# Patient Record
(demographics unavailable — no encounter records)

---

## 2024-11-05 NOTE — REVIEW OF SYSTEMS
[Constipation: Grade 0] : Constipation: Grade 0 [Diarrhea: Grade 0] : Diarrhea: Grade 0 [Urinary Tract Pain: Grade 0] : Urinary Tract Pain: Grade 0 [Urinary Urgency: Grade 0] : Urinary Urgency: Grade 0 [Urinary Frequency: Grade 0] : Urinary Frequency: Grade 0 [Erectile Dysfunction: Grade 2 - Decrease in erectile function (frequency/rigidity of erections), erectile intervention indicated, (e.g., medication or mechanical devices such as penile pump)] : Erectile Dysfunction: Grade 2 - Decrease in erectile function (frequency/rigidity of erections), erectile intervention indicated, (e.g., medication or mechanical devices such as penile pump) [Fatigue: Grade 0] : Fatigue: Grade 0 [Urinary Retention: Grade 0] : Urinary Retention: Grade 0

## 2024-11-05 NOTE — PHYSICAL EXAM
[Normal] : well developed, well nourished, in no acute distress [] : no respiratory distress [Respiration, Rhythm And Depth] : normal respiratory rhythm and effort [Skin Color & Pigmentation] : normal skin color and pigmentation [Oriented To Time, Place, And Person] : oriented to person, place, and time

## 2024-11-09 NOTE — HISTORY OF PRESENT ILLNESS
[FreeTextEntry1] : Mr. Zamora is 71M w/ cT2b G7(3+4) iPSA 4.76 (6/7/22) favorable intermediate risk prostate cancer, previously on active surveillance. He had 91 cc gland on MRI 7/5/22. Significant LUTS at baseline. He is s/p definitive radiation therapy to the prostate and seminal vesicles on 11/18/2022.  Radiation  Treatment Summary:  Prostate/SV  7,000 cGy in 28 fx from 10/12/2022 to 11/18/2022  Clinical Course: Tolerated the treatment relatively poorly; he had significant obstructive issues at baseline and over treatment had worsening urgency and frequency, and eventually obstructed requiring catheter placement. Hematuria was noted following catheter placement.He then failed voiding trial requiring catheter reinsertion. He then became intermittently obstructed and subsequently saw Dr. Crenshaw and was taught to self catheterize.  12/28/22 PTE: Continues to self catheterize every 4 hours. Notes intermittent urge but unable to void.  Denies  dysuria, hematuria or bowel symptoms. Taking tamsulosin BID and finasteride daily. Has appointment to see Dr. Crenshaw on Friday 12/30/22. Of note,  has flu and taking tamiflu.   3/9/23: Follow up Notes improvement in  symptoms. Urinating naturally with occasional straining. Intermittent self catheterization 2 x day. Denies leakage, dysuria, hematuria or bowel symptoms. Taking tamsulosin 2 tabs daily and finasteride daily. Erections suitable for masturbation. Of note, PVR approx. 350-360 cc today in clinic.  IPSS/QOL/EPIC Score. 20/2/12.   6/15/23: Follow up Notes  function back to baseline. Completed pelvic floor rehab and now able to void naturally. Nocturia 1x, good stream without straining.  Denies leakage, dysuria, hematuria or  bowel symptoms. Taking tamsulosin 2 tab at night and finasteride daily. Erections suitable for masturbation only.  IPSS/QOL/EPIC Score 2/0/4.   PSA trend:  iPSA 4.76 on 6/7/22 -> RT completed 11/18/2022   0.48 3/6/23: (note, on finasteride) 0.27 6/9/23, Test 545 (still on finasteride)  0.13 10/19/24  11/5/2024: Follow up Feels generally well. GI/ symptoms at baseline. Erections only suitable for masturbation. Never tried medical intervention. Continues on tamsulosin every 3-4 days or longer when he remembers.  IPSS/QOL/EPIC score: 0/0/3

## 2024-11-09 NOTE — DISEASE MANAGEMENT
[2] : T2 [b] : b [0] : M0 [0-10] : 0 -10 ng/mL [Biopsy with Fusion] : Patient had a biopsy with fusion on [6] : Fusion Biopsy Lanny Score: 6 [Biopsy results sent to PCP/Referring Physician] : Biopsy results sent to PCP/Referring Physician [] : Patient had a Prostate MRI [3] : 3 [IIB] : IIB [Active Surveillance] : Active Surveillance [Clinical] : TNM Stage: c [BiopsyDate] : 01/17 [MeasuredProstateVolume] : 62ml [TotalCores] : 12 [MaxCoreInvolvement] : 30% [TotalPositiveCores] : 4 [LastPSADate] : 4/9/18 [LastPSAResults] : 2.39 [LastMRIDate] : 9/27/17 [LastMRIResults] : Lesions in the left mid gland peripheral zone posteriorly PIRADS 3. Interval stability as compared with the prior study. [FirstSurveillanceBiopsyResults] : Prostate tissue with a small focus of atypical glands [TTNM] : 2b [NTNM] : 0 [MTNM] : 0

## 2025-04-23 NOTE — ASSESSMENT
[FreeTextEntry1] : 04/23/2025: Hip and pelvis x-rays, 2 views, reveals mild OA in RT>LT hip.  L spine x-rays, 2 views, reveals no fx/abnormalities.  Underlying pathology reviewed and treatment options discussed. He will continue with PT. Patient can't take NSAIDs d/t being on blood thinners.  Discussed the use of Tylenol.  Discussed Carrington exercises.  Activity modification as tolerated. Questions addressed. Follow up in 4-6 weeks/PRN.

## 2025-04-23 NOTE — IMAGING
[de-identified] : HIP EXAM tenderness over RT buttock decrease ROM, devin with external rotation mild greater trochanteric tenderness

## 2025-04-23 NOTE — HISTORY OF PRESENT ILLNESS
[6] : 6 [5] : 5 [Dull/Aching] : dull/aching [Sharp] : sharp [Occasional] : occasional [Household chores] : household chores [Rest] : rest [Exercising] : exercising [de-identified] : 4/24/25: 72 Y M presents for right hip pain. Pain started in February, but no incident reported. Prior to this, he reports no hx of hip pain. Denies any n/t or radicular pain.  [] : Post Surgical Visit: no [FreeTextEntry1] : right hip [FreeTextEntry7] : down the hamstring

## 2025-05-15 NOTE — HISTORY OF PRESENT ILLNESS
[FreeTextEntry1] : Mr. Zamora is 71M w/ cT2b G7(3+4) iPSA 4.76 (6/7/22) favorable intermediate risk prostate cancer, previously on active surveillance. He had 91 cc gland on MRI 7/5/22. Significant LUTS at baseline. He is s/p definitive radiation therapy to the prostate and seminal vesicles on 11/18/2022.  Radiation Treatment Summary: Prostate/SV 7,000 cGy in 28 fx from 10/12/2022 to 11/18/2022  PSA trend: iPSA 4.76 on 6/7/22 -> RT completed 11/18/2022 0.48 3/6/23: (note, on finasteride) 0.27 6/9/23, Test 545 (still on finasteride) 0.13 10/19/24 0.16 4/21/2025  5/15/2025 present for follow up, patient reports doing very well, no /GI problem. Mild problem with erection but declines medication help. IPSS/QOL/EPIC 0/0/3

## 2025-06-18 NOTE — HISTORY OF PRESENT ILLNESS
[de-identified] : 06/18/2025: Patient returns for RT hip FUV. He has continued RT hip discomfort. He reports the pain is radiating down to his hamstrings. He reports that he is borderline diabetic.   4/24/25: 72 Y M presents for right hip pain. Pain started in February, but no incident reported. Prior to this, he reports no hx of hip pain. Denies any n/t or radicular pain.   [6] : 6 [5] : 5 [Dull/Aching] : dull/aching [Sharp] : sharp [Occasional] : occasional [Household chores] : household chores [Rest] : rest [Exercising] : exercising [] : Post Surgical Visit: no [FreeTextEntry1] : right hip [FreeTextEntry7] : down the hamstring

## 2025-06-18 NOTE — ASSESSMENT
[FreeTextEntry1] : 04/23/2025: Hip and pelvis x-rays, 2 views, reveals mild OA in RT>LT hip.  L spine x-rays, 2 views, reveals no fx/abnormalities.  Underlying pathology reviewed and treatment options discussed. He will continue with PT. Patient can't take NSAIDs d/t being on blood thinners.  Discussed the use of Tylenol.  Discussed Carrington exercises.  Activity modification as tolerated. Questions addressed. Follow up in 4-6 weeks/PRN.   06/18/2025: We reviewed his course.  Patient reports radiating pain down to the hamstrings.  His radiating pain could be due to a herniated disc.  Obtain MRI of L spine. R/O HNP.  Discussed HEP. Discussed future option of CSI.  Questions answered.  Follow up after MRI.   The documentation recorded by the scribe accurately reflects the service I personally performed and the decisions made by me. I, Shelby Calix, attest that this documentation has been prepared under the direction and in the presence of Provider Dhaval Miles MD.   The patient was seen by Dhaval Miles MD.

## 2025-06-18 NOTE — DISCUSSION/SUMMARY
[de-identified] : The patient was advised of the diagnosis. The natural history of the pathology was explained in full to the patient in layman's terms. The risks and benefits of surgical and non-surgical treatment alternatives were explained in full to the patient. All questions were answered.

## 2025-06-18 NOTE — PHYSICAL EXAM
[Right] : right hip [] : patient ambulates without assistive device [FreeTextEntry8] : mild greater trochanteric tenderness.  [FreeTextEntry9] : Decrease ROM.

## 2025-07-02 NOTE — HISTORY OF PRESENT ILLNESS
[6] : 6 [5] : 5 [Dull/Aching] : dull/aching [Sharp] : sharp [Occasional] : occasional [Household chores] : household chores [Rest] : rest [Exercising] : exercising [de-identified] : 07/02/2025: Patient returns for RT hip FUV. He reports continued pain radiating from LB to his thigh. Has not started PT yet. Has been going to chiropractor with mild relief. Here to review MRI.   06/18/2025: Patient returns for RT hip FUV. He has continued RT hip discomfort. He reports the pain is radiating down to his hamstrings. He reports that he is borderline diabetic.   4/24/25: 72 Y M presents for right hip pain. Pain started in February, but no incident reported. Prior to this, he reports no hx of hip pain. Denies any n/t or radicular pain.    [] : Post Surgical Visit: no [FreeTextEntry1] : right hip [FreeTextEntry7] : down the hamstring

## 2025-07-02 NOTE — DATA REVIEWED
[MRI] : MRI [Lumbar Spine] : lumbar spine [Report was reviewed and noted in the chart] : The report was reviewed and noted in the chart [I independently reviewed and interpreted images and report] : I independently reviewed and interpreted images and report [FreeTextEntry1] : MRI L SPINE [facility] 06/20/25: 1. Multilevel DDD with broad-based posterior disc herniation, mild central stenosis, bilateral S1 nerve root impingement and right greater than left exiting L5 nerve root impingement at L5-S1.  2. Broad based posterior disc herniation, mild central stenosis and moderate bilateral narrowing at L3-L4 as well as mild central stenosis at L2-L3, with multilevel anterior and lateral spondylosis most severe on the left at T11-T12 in the lower thoracic spine where there is also centrals stenosis and foraminal narrowing.  3. Multiple bilateral renal cysts suspected and urinary bladder distention of uncertain clinical significance and etiology. Recommend ultrasound of the kidneys and bladder to further evaluate.  4. 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

## 2025-07-02 NOTE — HISTORY OF PRESENT ILLNESS
[6] : 6 [5] : 5 [Dull/Aching] : dull/aching [Sharp] : sharp [Occasional] : occasional [Household chores] : household chores [Rest] : rest [Exercising] : exercising [de-identified] : 07/02/2025: Patient returns for RT hip FUV. He reports continued pain radiating from LB to his thigh. Has not started PT yet. Has been going to chiropractor with mild relief. Here to review MRI.   06/18/2025: Patient returns for RT hip FUV. He has continued RT hip discomfort. He reports the pain is radiating down to his hamstrings. He reports that he is borderline diabetic.   4/24/25: 72 Y M presents for right hip pain. Pain started in February, but no incident reported. Prior to this, he reports no hx of hip pain. Denies any n/t or radicular pain.    [] : Post Surgical Visit: no [FreeTextEntry1] : right hip [FreeTextEntry7] : down the hamstring

## 2025-07-02 NOTE — ASSESSMENT
[FreeTextEntry1] : 04/23/2025: Hip and pelvis x-rays, 2 views, reveals mild OA in RT>LT hip.  L spine x-rays, 2 views, reveals no fx/abnormalities.  Underlying pathology reviewed and treatment options discussed. He will continue with PT. Patient can't take NSAIDs d/t being on blood thinners.  Discussed the use of Tylenol.  Discussed Carrington exercises.  Activity modification as tolerated. Questions addressed. Follow up in 4-6 weeks/PRN.   06/18/2025: We reviewed his course.  Patient reports radiating pain down to the hamstrings.  His radiating pain could be due to a herniated disc.  Obtain MRI of L spine. R/O HNP.  Discussed HEP. Discussed future option of CSI.  Questions answered.  Follow up after MRI.   07/02/2025: MRI reviewed and discussed. Based on my independent interpretation of the MRI images there's multilevel DDD with broad base posterior disc herniations, and central stenosis.   Underlying pathology and treatment options discussed. Start PT and HEP to improve mechanics and reduce pain. Radiculopathy vs hip pain.  Activity modification as tolerated. Questions addressed. Follow up in 4-6 weeks if symptoms persist.    The documentation recorded by the scribe accurately reflects the service I personally performed and the decisions made by me. I, Shelby Calix, attest that this documentation has been prepared under the direction and in the presence of Provider Dhaval Miles MD.   The patient was seen by Dhaval Miles MD.

## 2025-07-02 NOTE — DISCUSSION/SUMMARY
[de-identified] : The patient was advised of the diagnosis. The natural history of the pathology was explained in full to the patient in layman's terms. The risks and benefits of surgical and non-surgical treatment alternatives were explained in full to the patient. All questions were answered.

## 2025-07-02 NOTE — DISCUSSION/SUMMARY
[de-identified] : The patient was advised of the diagnosis. The natural history of the pathology was explained in full to the patient in layman's terms. The risks and benefits of surgical and non-surgical treatment alternatives were explained in full to the patient. All questions were answered.

## 2025-07-02 NOTE — PHYSICAL EXAM
[Right] : right hip [] : no groin pain with external rotation [FreeTextEntry8] : mild greater trochanteric tenderness.  [FreeTextEntry9] : Decrease ROM.